# Patient Record
Sex: FEMALE | Employment: UNEMPLOYED
[De-identification: names, ages, dates, MRNs, and addresses within clinical notes are randomized per-mention and may not be internally consistent; named-entity substitution may affect disease eponyms.]

---

## 2017-08-19 ENCOUNTER — HEALTH MAINTENANCE LETTER (OUTPATIENT)
Age: 37
End: 2017-08-19

## 2019-11-11 ENCOUNTER — OFFICE VISIT (OUTPATIENT)
Dept: FAMILY MEDICINE | Facility: CLINIC | Age: 39
End: 2019-11-11
Payer: MEDICAID

## 2019-11-11 VITALS
BODY MASS INDEX: 41.05 KG/M2 | WEIGHT: 246.4 LBS | DIASTOLIC BLOOD PRESSURE: 74 MMHG | RESPIRATION RATE: 12 BRPM | HEART RATE: 84 BPM | HEIGHT: 65 IN | SYSTOLIC BLOOD PRESSURE: 124 MMHG | TEMPERATURE: 98 F

## 2019-11-11 DIAGNOSIS — B96.89 BV (BACTERIAL VAGINOSIS): ICD-10-CM

## 2019-11-11 DIAGNOSIS — R30.0 DYSURIA: Primary | ICD-10-CM

## 2019-11-11 DIAGNOSIS — R82.90 ABNORMAL FINDING ON URINALYSIS: ICD-10-CM

## 2019-11-11 DIAGNOSIS — N76.0 BV (BACTERIAL VAGINOSIS): ICD-10-CM

## 2019-11-11 LAB
ALBUMIN UR-MCNC: NEGATIVE MG/DL
APPEARANCE UR: ABNORMAL
BACTERIA #/AREA URNS HPF: ABNORMAL /HPF
BILIRUB UR QL STRIP: ABNORMAL
COLOR UR AUTO: YELLOW
GLUCOSE UR STRIP-MCNC: NEGATIVE MG/DL
HGB UR QL STRIP: NEGATIVE
KETONES UR STRIP-MCNC: NEGATIVE MG/DL
LEUKOCYTE ESTERASE UR QL STRIP: NEGATIVE
NITRATE UR QL: NEGATIVE
NON-SQ EPI CELLS #/AREA URNS LPF: ABNORMAL /LPF
PH UR STRIP: 6 PH (ref 5–7)
RBC #/AREA URNS AUTO: ABNORMAL /HPF
SOURCE: ABNORMAL
SP GR UR STRIP: 1.02 (ref 1–1.03)
SPECIMEN SOURCE: ABNORMAL
UROBILINOGEN UR STRIP-ACNC: 0.2 EU/DL (ref 0.2–1)
WBC #/AREA URNS AUTO: ABNORMAL /HPF
WET PREP SPEC: ABNORMAL

## 2019-11-11 PROCEDURE — 99203 OFFICE O/P NEW LOW 30 MIN: CPT | Performed by: NURSE PRACTITIONER

## 2019-11-11 PROCEDURE — 87210 SMEAR WET MOUNT SALINE/INK: CPT | Performed by: NURSE PRACTITIONER

## 2019-11-11 PROCEDURE — 87086 URINE CULTURE/COLONY COUNT: CPT | Performed by: NURSE PRACTITIONER

## 2019-11-11 PROCEDURE — 81001 URINALYSIS AUTO W/SCOPE: CPT | Performed by: NURSE PRACTITIONER

## 2019-11-11 RX ORDER — METRONIDAZOLE 7.5 MG/G
1 GEL VAGINAL DAILY
Qty: 25 G | Refills: 0 | Status: SHIPPED | OUTPATIENT
Start: 2019-11-11 | End: 2020-01-31

## 2019-11-11 RX ORDER — METRONIDAZOLE 500 MG/1
500 TABLET ORAL 2 TIMES DAILY
Qty: 14 TABLET | Refills: 0 | Status: SHIPPED | OUTPATIENT
Start: 2019-11-11 | End: 2020-01-27

## 2019-11-11 ASSESSMENT — ENCOUNTER SYMPTOMS
LIGHT-HEADEDNESS: 0
DYSURIA: 0
DIZZINESS: 0
COUGH: 0
FEVER: 0
SHORTNESS OF BREATH: 0
FATIGUE: 0
HEADACHES: 0
DIAPHORESIS: 0
FREQUENCY: 0
CHILLS: 0
EYE DISCHARGE: 0
CONSTIPATION: 0
SORE THROAT: 0
SINUS PRESSURE: 0
RHINORRHEA: 0
NAUSEA: 0
EYE ITCHING: 0
WHEEZING: 0
DIARRHEA: 0

## 2019-11-11 ASSESSMENT — MIFFLIN-ST. JEOR: SCORE: 1783.54

## 2019-11-11 ASSESSMENT — PAIN SCALES - GENERAL: PAINLEVEL: NO PAIN (0)

## 2019-11-11 NOTE — PROGRESS NOTES
Subjective     Paige Jimenez is a 39 year old female who presents to clinic today for the following health issues:    HPI     Vaginal Symptoms      Duration: off and on for a few months    Description  Foul smelling urine, cloudy urine, vaginal itching, painful inside vagina, vaginal discharge    Intensity:  moderate    Accompanying signs and symptoms (fever/dysuria/abdominal or back pain): left pelvic pain-at first thought was constipation    History  Sexually active: yes, single partner, contraception - tubal ligation  Possibility of pregnancy: No  Recent antibiotic use: no     Precipitating or alleviating factors: history of frequent BV    Therapies tried and outcome: changing soap   Outcome: not effective. Took Flagyl about 2 months ago for BV and it helped.     Current Outpatient Medications   Medication Sig Dispense Refill     acetaminophen (TYLENOL) 500 MG tablet Take 1,000 mg by mouth every 6 hours as needed for mild pain       albuterol (PROAIR HFA, PROVENTIL HFA, VENTOLIN HFA) 108 (90 BASE) MCG/ACT inhaler Inhale 2 puffs into the lungs every 6 hours       blood glucose monitoring (ACCU-CHEK AN PLUS) meter device kit Use to test blood sugars 1 times daily or as directed. 1 kit 0     blood glucose monitoring (ACCU-CHEK AN PLUS) meter device kit Use to test blood sugars 1 times daily or as directed. Check blood sugars a different times during the day 1 kit 0     blood glucose monitoring (ACCU-CHEK AN) test strip Use to test blood sugars 1 time daily or as directed. 1 Box 12     blood glucose monitoring (SOFTCLIX) lancets Use to test blood sugar 1 times daily or as directed. 1 Box 1     Cholecalciferol (VITAMIN D3 PO) Take 5,000 Units by mouth daily       Ferrous Sulfate (IRON SUPPLEMENT PO) Take 325 mg by mouth       Ibuprofen (ADVIL PO) Take 400 mg by mouth every 4 hours as needed for moderate pain       METFORMIN HCL PO Take 500 mg by mouth       metroNIDAZOLE (FLAGYL) 500 MG tablet Take 1 tablet  "(500 mg) by mouth 2 times daily for 7 days 14 tablet 0     metroNIDAZOLE (METROGEL) 0.75 % vaginal gel Place 1 applicator (5 g) vaginally daily for 5 days 25 g 0     phentermine (ADIPEX-P) 37.5 MG tablet Take 37.5 mg by mouth every morning (before breakfast)       calcium carbonate-vitamin D (CALTRATE 600+D) 600-400 MG-UNIT CHEW Take 1 chew tab by mouth 2 times daily (Patient not taking: Reported on 11/11/2019) 180 tablet 3     diclofenac (VOLTAREN) 1 % GEL Apply topically 4 times daily       naproxen sodium (ANAPROX) 220 MG tablet Take 220 mg by mouth 3 times daily (with meals)       No Known Allergies    Reviewed and updated as needed this visit by Provider           Is prone to BV. Seems to get it right before or right after period. Last time that had flagyl was in July. Has been to GYN in the past for BV.  Continues to get it.    Feels like has a bladder infection. Left pelvic pain that will come and go. Pain is in the same spot. No fevers or chills. Cloudy urine and fouls smell.     Blood sugars have been about the same, is getting 130-160.           Objective    /74 (BP Location: Right arm, Patient Position: Sitting, Cuff Size: Adult Large)   Pulse 84   Temp 98  F (36.7  C) (Tympanic)   Resp 12   Ht 1.651 m (5' 5\")   Wt 111.8 kg (246 lb 6.4 oz)   LMP 10/20/2019   BMI 41.00 kg/m    Body mass index is 41 kg/m .        Assessment & Plan     Review of Systems   Constitutional: Negative for chills, diaphoresis, fatigue and fever.   HENT: Negative for ear discharge, ear pain, hearing loss, rhinorrhea, sinus pressure and sore throat.    Eyes: Negative for discharge and itching.   Respiratory: Negative for cough, shortness of breath and wheezing.    Gastrointestinal: Negative for constipation, diarrhea and nausea.   Genitourinary: Positive for vaginal discharge. Negative for dysuria, frequency and urgency.        Cloudy urine  Odor to urine     Skin: Negative for rash.   Neurological: Negative for " dizziness, light-headedness and headaches.       Physical Exam  Constitutional:       Appearance: She is well-developed.   HENT:      Right Ear: Tympanic membrane and external ear normal. No middle ear effusion. Tympanic membrane is not erythematous.      Left Ear: Tympanic membrane and external ear normal.  No middle ear effusion. Tympanic membrane is not erythematous.      Nose: No mucosal edema or rhinorrhea.   Cardiovascular:      Rate and Rhythm: Normal rate and regular rhythm.      Heart sounds: Normal heart sounds.   Pulmonary:      Effort: Pulmonary effort is normal.      Breath sounds: Normal breath sounds.   Abdominal:      General: Bowel sounds are normal.      Palpations: Abdomen is soft.      Tenderness: There is tenderness in the suprapubic area.   Skin:     General: Skin is warm and dry.   Neurological:      Mental Status: She is alert.         1. Dysuria    - UA reflex to Microscopic and Culture  - Wet prep  - Urine Microscopic    2. BV (bacterial vaginosis)  Explained this is not a sexually transmitted disease.   Do not drink alcohol with this med as it can make you very ill.   Please follow up if your symptoms do not improve.   - metroNIDAZOLE (FLAGYL) 500 MG tablet; Take 1 tablet (500 mg) by mouth 2 times daily for 7 days  Dispense: 14 tablet; Refill: 0  - metroNIDAZOLE (METROGEL) 0.75 % vaginal gel; Place 1 applicator (5 g) vaginally daily for 5 days  Dispense: 25 g; Refill: 0    3. Abnormal finding on urinalysis  We will send urine to the lab for a culture.  We will plan to treat when returns if indicated.  - Urine Culture Aerobic Bacterial      Return in about 2 weeks (around 11/25/2019).    LISA Tran [facility] [address]

## 2019-11-12 LAB
BACTERIA SPEC CULT: NO GROWTH
Lab: NORMAL
SPECIMEN SOURCE: NORMAL

## 2020-01-27 ENCOUNTER — TELEPHONE (OUTPATIENT)
Dept: FAMILY MEDICINE | Facility: CLINIC | Age: 40
End: 2020-01-27

## 2020-01-27 DIAGNOSIS — B96.89 BV (BACTERIAL VAGINOSIS): ICD-10-CM

## 2020-01-27 DIAGNOSIS — N76.0 BV (BACTERIAL VAGINOSIS): ICD-10-CM

## 2020-01-27 RX ORDER — METRONIDAZOLE 500 MG/1
500 TABLET ORAL 2 TIMES DAILY
Qty: 14 TABLET | Refills: 0 | Status: SHIPPED | OUTPATIENT
Start: 2020-01-27

## 2020-01-27 NOTE — TELEPHONE ENCOUNTER
Patient called requesting a refill on her flagyl.  Patient states she is having symptoms.  She is asking for it to be done soon if possible please.    Keiry Piedra [place]

## 2020-01-31 DIAGNOSIS — N76.0 BV (BACTERIAL VAGINOSIS): ICD-10-CM

## 2020-01-31 DIAGNOSIS — B96.89 BV (BACTERIAL VAGINOSIS): ICD-10-CM

## 2020-01-31 RX ORDER — METRONIDAZOLE 7.5 MG/G
1 GEL VAGINAL DAILY
Qty: 25 G | Refills: 0 | Status: SHIPPED | OUTPATIENT
Start: 2020-01-31 | End: 2020-06-24

## 2020-02-16 ENCOUNTER — HEALTH MAINTENANCE LETTER (OUTPATIENT)
Age: 40
End: 2020-02-16

## 2020-04-08 ENCOUNTER — OFFICE VISIT (OUTPATIENT)
Dept: FAMILY MEDICINE | Facility: CLINIC | Age: 40
End: 2020-04-08
Payer: COMMERCIAL

## 2020-04-08 VITALS
TEMPERATURE: 97.9 F | RESPIRATION RATE: 12 BRPM | DIASTOLIC BLOOD PRESSURE: 78 MMHG | WEIGHT: 244.25 LBS | SYSTOLIC BLOOD PRESSURE: 122 MMHG | HEART RATE: 64 BPM | BODY MASS INDEX: 40.65 KG/M2

## 2020-04-08 DIAGNOSIS — Z11.3 SCREEN FOR STD (SEXUALLY TRANSMITTED DISEASE): ICD-10-CM

## 2020-04-08 DIAGNOSIS — N76.0 BV (BACTERIAL VAGINOSIS): ICD-10-CM

## 2020-04-08 DIAGNOSIS — B37.31 YEAST VAGINITIS: ICD-10-CM

## 2020-04-08 DIAGNOSIS — B96.89 BV (BACTERIAL VAGINOSIS): ICD-10-CM

## 2020-04-08 DIAGNOSIS — N89.8 VAGINAL ITCHING: Primary | ICD-10-CM

## 2020-04-08 LAB
SPECIMEN SOURCE: ABNORMAL
WET PREP SPEC: ABNORMAL

## 2020-04-08 PROCEDURE — 87491 CHLMYD TRACH DNA AMP PROBE: CPT | Performed by: FAMILY MEDICINE

## 2020-04-08 PROCEDURE — 87591 N.GONORRHOEAE DNA AMP PROB: CPT | Performed by: FAMILY MEDICINE

## 2020-04-08 PROCEDURE — 99213 OFFICE O/P EST LOW 20 MIN: CPT | Performed by: FAMILY MEDICINE

## 2020-04-08 PROCEDURE — 87210 SMEAR WET MOUNT SALINE/INK: CPT | Performed by: FAMILY MEDICINE

## 2020-04-08 RX ORDER — METRONIDAZOLE 500 MG/1
500 TABLET ORAL 2 TIMES DAILY
Qty: 14 TABLET | Refills: 0 | Status: CANCELLED | OUTPATIENT
Start: 2020-04-08

## 2020-04-08 RX ORDER — METRONIDAZOLE 500 MG/1
500 TABLET ORAL 2 TIMES DAILY
Qty: 14 TABLET | Refills: 0 | Status: SHIPPED | OUTPATIENT
Start: 2020-04-08

## 2020-04-08 RX ORDER — FLUCONAZOLE 150 MG/1
150 TABLET ORAL ONCE
Qty: 1 TABLET | Refills: 0 | Status: SHIPPED | OUTPATIENT
Start: 2020-04-08 | End: 2020-04-08

## 2020-04-08 ASSESSMENT — PAIN SCALES - GENERAL: PAINLEVEL: NO PAIN (0)

## 2020-04-08 NOTE — PATIENT INSTRUCTIONS

## 2020-04-08 NOTE — PROGRESS NOTES
Subjective     Paige Jimenez is a 40 year old female who presents to clinic today for the following health issues:    40 yr old female here for vaginal symptoms. She described the vaginal discharge as yellowish. She has had some odor and also has had some itching .Patient reports no abdominal pain.     HPI     Vaginal Symptoms  Onset: 6 days    Description:  Vaginal Discharge: yellow   Itching (Pruritis): YES  Burning sensation:  YES  Odor: YES    Accompanying Signs & Symptoms:  Pain with Urination: no   Abdominal Pain: no   Fever: no     History:   Sexually active: YES- she and boyfriend have long distance relationship  New Partner: no   Possibility of Pregnancy:  No    Precipitating factors:   Recent Antibiotic Use: no     Alleviating factors:  None    Therapies Tried and outcome: OTC Monistat gel      Patient Active Problem List   Diagnosis     Family history of malignant neoplasm of breast     Slow transit constipation     Other abnormal glucose     History reviewed. No pertinent surgical history.    Social History     Tobacco Use     Smoking status: Former Smoker     Packs/day: 0.25     Types: Cigarettes     Start date: 11/20/1995     Smokeless tobacco: Never Used     Tobacco comment: Quit March 2019!!   Substance Use Topics     Alcohol use: Yes     Alcohol/week: 0.0 standard drinks     Comment: occ - 0-3 drinks per week     History reviewed. No pertinent family history.      Current Outpatient Medications   Medication Sig Dispense Refill     metroNIDAZOLE (FLAGYL) 500 MG tablet Take 1 tablet (500 mg) by mouth 2 times daily 14 tablet 0     acetaminophen (TYLENOL) 500 MG tablet Take 1,000 mg by mouth every 6 hours as needed for mild pain       albuterol (PROAIR HFA, PROVENTIL HFA, VENTOLIN HFA) 108 (90 BASE) MCG/ACT inhaler Inhale 2 puffs into the lungs every 6 hours       blood glucose monitoring (ACCU-CHEK AN PLUS) meter device kit Use to test blood sugars 1 times daily or as directed. 1 kit 0     blood  glucose monitoring (ACCU-CHEK AN PLUS) meter device kit Use to test blood sugars 1 times daily or as directed. Check blood sugars a different times during the day 1 kit 0     blood glucose monitoring (ACCU-CHEK AN) test strip Use to test blood sugars 1 time daily or as directed. 1 Box 12     blood glucose monitoring (SOFTCLIX) lancets Use to test blood sugar 1 times daily or as directed. 1 Box 1     calcium carbonate-vitamin D (CALTRATE 600+D) 600-400 MG-UNIT CHEW Take 1 chew tab by mouth 2 times daily (Patient not taking: Reported on 11/11/2019) 180 tablet 3     Cholecalciferol (VITAMIN D3 PO) Take 5,000 Units by mouth daily       diclofenac (VOLTAREN) 1 % GEL Apply topically 4 times daily       Ferrous Sulfate (IRON SUPPLEMENT PO) Take 325 mg by mouth       Ibuprofen (ADVIL PO) Take 400 mg by mouth every 4 hours as needed for moderate pain       METFORMIN HCL PO Take 500 mg by mouth       metroNIDAZOLE (FLAGYL) 500 MG tablet Take 1 tablet (500 mg) by mouth 2 times daily 14 tablet 0     metroNIDAZOLE (METROGEL) 0.75 % vaginal gel Place 1 applicator (5 g) vaginally daily 25 g 0     naproxen sodium (ANAPROX) 220 MG tablet Take 220 mg by mouth 3 times daily (with meals)       phentermine (ADIPEX-P) 37.5 MG tablet Take 37.5 mg by mouth every morning (before breakfast)       No Known Allergies  BP Readings from Last 3 Encounters:   04/08/20 122/78   11/11/19 124/74   01/18/16 130/88    Wt Readings from Last 3 Encounters:   04/08/20 110.8 kg (244 lb 4 oz)   11/11/19 111.8 kg (246 lb 6.4 oz)   01/18/16 97.3 kg (214 lb 6.4 oz)                      Reviewed and updated as needed this visit by Provider  Tobacco  Allergies  Meds  Problems  Med Hx  Surg Hx  Fam Hx         Review of Systems   ROS COMP: Constitutional, HEENT, cardiovascular, pulmonary, gi and gu systems are negative, except as otherwise noted.      Objective    /78   Pulse 64   Temp 97.9  F (36.6  C) (Tympanic)   Resp 12   Wt 110.8 kg (244  lb 4 oz)   LMP 04/01/2020 (Exact Date)   Breastfeeding No   BMI 40.65 kg/m    Body mass index is 40.65 kg/m .  Physical Exam   GENERAL: healthy, alert and no distress    Diagnostic Test Results:  Results for orders placed or performed in visit on 04/08/20   Wet prep     Status: Abnormal    Specimen: Vagina   Result Value Ref Range    Specimen Description Vagina     Wet Prep No Trichomonas seen     Wet Prep Clue cells seen  Moderate   (A)     Wet Prep Yeast seen  Few   (A)     Wet Prep WBC'S seen  Few      Chlamydia trachomatis PCR     Status: None    Specimen: Urine   Result Value Ref Range    Specimen Description Urine     Chlamydia Trachomatis PCR Negative NEG^Negative   Neisseria gonorrhoeae PCR     Status: None    Specimen: Urine   Result Value Ref Range    Specimen Descrip Urine     N Gonorrhea PCR Negative NEG^Negative           Assessment & Plan     1. Vaginal itching  Wet prep showed clue cells.  - Wet prep    2. Screen for STD (sexually transmitted disease)  .Patient will be notified of results  - Chlamydia trachomatis PCR  - Neisseria gonorrhoeae PCR    3. BV (bacterial vaginosis)  Medication faxed .  - metroNIDAZOLE (FLAGYL) 500 MG tablet; Take 1 tablet (500 mg) by mouth 2 times daily  Dispense: 14 tablet; Refill: 0    4. Yeast vaginitis  Medication faxed  - fluconazole (DIFLUCAN) 150 MG tablet; Take 1 tablet (150 mg) by mouth once for 1 dose  Dispense: 1 tablet; Refill: 0             FUTURE APPOINTMENTS:       - Follow-up visit in one month or sooner as needed.    Return in about 4 weeks (around 5/6/2020) for Routine Visit.    Soto Gutierrez MD  [facility]

## 2020-04-09 LAB
C TRACH DNA SPEC QL NAA+PROBE: NEGATIVE
N GONORRHOEA DNA SPEC QL NAA+PROBE: NEGATIVE
SPECIMEN SOURCE: NORMAL
SPECIMEN SOURCE: NORMAL

## 2020-04-09 NOTE — RESULT ENCOUNTER NOTE
Please inform patient that test result ( STD testing )  was negative.  Thank you.     Soto Gutierrez M.D.

## 2020-06-24 ENCOUNTER — TELEPHONE (OUTPATIENT)
Dept: FAMILY MEDICINE | Facility: CLINIC | Age: 40
End: 2020-06-24

## 2020-06-24 DIAGNOSIS — B96.89 BV (BACTERIAL VAGINOSIS): ICD-10-CM

## 2020-06-24 DIAGNOSIS — N76.0 BV (BACTERIAL VAGINOSIS): ICD-10-CM

## 2020-06-24 RX ORDER — METRONIDAZOLE 7.5 MG/G
GEL VAGINAL
Qty: 70 G | Refills: 0 | Status: SHIPPED | OUTPATIENT
Start: 2020-06-24 | End: 2020-07-31

## 2020-07-02 RX ORDER — METRONIDAZOLE 500 MG/1
TABLET ORAL
Qty: 14 TABLET | Refills: 0 | OUTPATIENT
Start: 2020-07-02

## 2020-07-02 NOTE — TELEPHONE ENCOUNTER
Message left for patient to return call to clinic.  CSS - ok to deliver message below.  Flagyl RX denied - needs VRT appt, please help schedule.  Bonnie CONTRERAS RN BSN

## 2020-07-03 NOTE — TELEPHONE ENCOUNTER
2nd Attempted to contact patient, no answer, left voice message to call back.    [facility]  okay to deliver message.

## 2020-07-06 NOTE — TELEPHONE ENCOUNTER
3rd attempt: unable to leave message.     CSS: please let patient know refill for Flagyl was denied-needs virtual visit.     BRETT CisnerosN, RN

## 2020-11-24 DIAGNOSIS — B96.89 BV (BACTERIAL VAGINOSIS): ICD-10-CM

## 2020-11-24 DIAGNOSIS — N76.0 BV (BACTERIAL VAGINOSIS): ICD-10-CM

## 2020-11-24 NOTE — TELEPHONE ENCOUNTER
Requested Prescriptions   Pending Prescriptions Disp Refills     metroNIDAZOLE (METROGEL) 0.75 % vaginal gel [Pharmacy Med Name: METRONIDAZOLE 0.75% VAGINAL GEL 70G] 70 g 0     Sig: INSERT 1 APPLICATORFUL VAGINALLY EVERY DAY AS DIRECTED       There is no refill protocol information for this order

## 2020-11-29 ENCOUNTER — HEALTH MAINTENANCE LETTER (OUTPATIENT)
Age: 40
End: 2020-11-29

## 2020-12-09 RX ORDER — METRONIDAZOLE 7.5 MG/G
GEL VAGINAL
Qty: 70 G | Refills: 0 | OUTPATIENT
Start: 2020-12-09

## 2020-12-09 NOTE — TELEPHONE ENCOUNTER
Medication prescribed for BV.  Has been seen 2 times this year for BV.  Has refilled this medication 5 times?  Left message for the patient to call the clinic.  Marcella MANZO RN

## 2021-04-10 ENCOUNTER — HEALTH MAINTENANCE LETTER (OUTPATIENT)
Age: 41
End: 2021-04-10

## 2021-09-19 ENCOUNTER — HEALTH MAINTENANCE LETTER (OUTPATIENT)
Age: 41
End: 2021-09-19

## 2022-05-01 ENCOUNTER — HEALTH MAINTENANCE LETTER (OUTPATIENT)
Age: 42
End: 2022-05-01

## 2022-11-16 ENCOUNTER — APPOINTMENT (OUTPATIENT)
Dept: ULTRASOUND IMAGING | Age: 42
End: 2022-11-16
Attending: FAMILY MEDICINE

## 2022-11-21 ENCOUNTER — HEALTH MAINTENANCE LETTER (OUTPATIENT)
Age: 42
End: 2022-11-21

## 2023-04-16 ENCOUNTER — HEALTH MAINTENANCE LETTER (OUTPATIENT)
Age: 43
End: 2023-04-16

## 2023-06-02 ENCOUNTER — HEALTH MAINTENANCE LETTER (OUTPATIENT)
Age: 43
End: 2023-06-02